# Patient Record
Sex: MALE | Race: BLACK OR AFRICAN AMERICAN | NOT HISPANIC OR LATINO | Employment: OTHER | URBAN - METROPOLITAN AREA
[De-identification: names, ages, dates, MRNs, and addresses within clinical notes are randomized per-mention and may not be internally consistent; named-entity substitution may affect disease eponyms.]

---

## 2018-06-23 ENCOUNTER — HOSPITAL ENCOUNTER (EMERGENCY)
Facility: CLINIC | Age: 48
Discharge: HOME | End: 2018-06-23
Attending: EMERGENCY MEDICINE | Admitting: EMERGENCY MEDICINE
Payer: COMMERCIAL

## 2018-06-23 ENCOUNTER — APPOINTMENT (OUTPATIENT)
Dept: GENERAL RADIOLOGY | Facility: CLINIC | Age: 48
End: 2018-06-23
Attending: EMERGENCY MEDICINE
Payer: COMMERCIAL

## 2018-06-23 VITALS
HEART RATE: 71 BPM | DIASTOLIC BLOOD PRESSURE: 84 MMHG | SYSTOLIC BLOOD PRESSURE: 124 MMHG | TEMPERATURE: 99.2 F | RESPIRATION RATE: 16 BRPM | OXYGEN SATURATION: 96 %

## 2018-06-23 DIAGNOSIS — M79.10 MYALGIA: ICD-10-CM

## 2018-06-23 DIAGNOSIS — H66.003 ACUTE SUPPURATIVE OTITIS MEDIA OF BOTH EARS WITHOUT SPONTANEOUS RUPTURE OF TYMPANIC MEMBRANES, RECURRENCE NOT SPECIFIED: ICD-10-CM

## 2018-06-23 DIAGNOSIS — G44.209 TENSION-TYPE HEADACHE, NOT INTRACTABLE, UNSPECIFIED CHRONICITY PATTERN: ICD-10-CM

## 2018-06-23 DIAGNOSIS — R50.9 FEVER CHILLS: ICD-10-CM

## 2018-06-23 LAB
ANION GAP SERPL CALCULATED.3IONS-SCNC: 6 MMOL/L (ref 3–14)
BASOPHILS # BLD AUTO: 0.1 10E9/L (ref 0–0.2)
BASOPHILS NFR BLD AUTO: 1.2 %
BUN SERPL-MCNC: 11 MG/DL (ref 7–30)
CALCIUM SERPL-MCNC: 8.7 MG/DL (ref 8.5–10.1)
CHLORIDE SERPL-SCNC: 107 MMOL/L (ref 94–109)
CK SERPL-CCNC: 118 U/L (ref 30–300)
CO2 SERPL-SCNC: 28 MMOL/L (ref 20–32)
CREAT SERPL-MCNC: 1.15 MG/DL (ref 0.66–1.25)
DEPRECATED S PYO AG THROAT QL EIA: NORMAL
DIFFERENTIAL METHOD BLD: NORMAL
EOSINOPHIL # BLD AUTO: 0.3 10E9/L (ref 0–0.7)
EOSINOPHIL NFR BLD AUTO: 4.3 %
ERYTHROCYTE [DISTWIDTH] IN BLOOD BY AUTOMATED COUNT: 13 % (ref 10–15)
GFR SERPL CREATININE-BSD FRML MDRD: 68 ML/MIN/1.7M2
GLUCOSE SERPL-MCNC: 82 MG/DL (ref 70–99)
HCT VFR BLD AUTO: 46.4 % (ref 40–53)
HGB BLD-MCNC: 15.5 G/DL (ref 13.3–17.7)
IMM GRANULOCYTES # BLD: 0 10E9/L (ref 0–0.4)
IMM GRANULOCYTES NFR BLD: 0.2 %
LYMPHOCYTES # BLD AUTO: 1.4 10E9/L (ref 0.8–5.3)
LYMPHOCYTES NFR BLD AUTO: 24.4 %
MCH RBC QN AUTO: 30.3 PG (ref 26.5–33)
MCHC RBC AUTO-ENTMCNC: 33.4 G/DL (ref 31.5–36.5)
MCV RBC AUTO: 91 FL (ref 78–100)
MONOCYTES # BLD AUTO: 1 10E9/L (ref 0–1.3)
MONOCYTES NFR BLD AUTO: 17.7 %
NEUTROPHILS # BLD AUTO: 3 10E9/L (ref 1.6–8.3)
NEUTROPHILS NFR BLD AUTO: 52.2 %
NRBC # BLD AUTO: 0 10*3/UL
NRBC BLD AUTO-RTO: 0 /100
PLATELET # BLD AUTO: 237 10E9/L (ref 150–450)
POTASSIUM SERPL-SCNC: 4.3 MMOL/L (ref 3.4–5.3)
RBC # BLD AUTO: 5.12 10E12/L (ref 4.4–5.9)
SODIUM SERPL-SCNC: 141 MMOL/L (ref 133–144)
SPECIMEN SOURCE: NORMAL
WBC # BLD AUTO: 5.8 10E9/L (ref 4–11)

## 2018-06-23 PROCEDURE — 99284 EMERGENCY DEPT VISIT MOD MDM: CPT | Mod: 25

## 2018-06-23 PROCEDURE — 87880 STREP A ASSAY W/OPTIC: CPT | Performed by: EMERGENCY MEDICINE

## 2018-06-23 PROCEDURE — 87081 CULTURE SCREEN ONLY: CPT | Performed by: EMERGENCY MEDICINE

## 2018-06-23 PROCEDURE — 25000132 ZZH RX MED GY IP 250 OP 250 PS 637: Performed by: EMERGENCY MEDICINE

## 2018-06-23 PROCEDURE — 82550 ASSAY OF CK (CPK): CPT | Performed by: EMERGENCY MEDICINE

## 2018-06-23 PROCEDURE — 85025 COMPLETE CBC W/AUTO DIFF WBC: CPT | Performed by: EMERGENCY MEDICINE

## 2018-06-23 PROCEDURE — 71046 X-RAY EXAM CHEST 2 VIEWS: CPT

## 2018-06-23 PROCEDURE — 36415 COLL VENOUS BLD VENIPUNCTURE: CPT | Performed by: EMERGENCY MEDICINE

## 2018-06-23 PROCEDURE — 80048 BASIC METABOLIC PNL TOTAL CA: CPT | Performed by: EMERGENCY MEDICINE

## 2018-06-23 RX ORDER — IBUPROFEN 600 MG/1
600 TABLET, FILM COATED ORAL ONCE
Status: COMPLETED | OUTPATIENT
Start: 2018-06-23 | End: 2018-06-23

## 2018-06-23 RX ORDER — AMOXICILLIN 500 MG/1
500 CAPSULE ORAL 3 TIMES DAILY
Qty: 30 CAPSULE | Refills: 0 | Status: SHIPPED | OUTPATIENT
Start: 2018-06-23 | End: 2018-07-03

## 2018-06-23 RX ADMIN — IBUPROFEN 600 MG: 600 TABLET ORAL at 18:26

## 2018-06-23 ASSESSMENT — ENCOUNTER SYMPTOMS
SORE THROAT: 0
SHORTNESS OF BREATH: 0
FEVER: 1
DIARRHEA: 0
COUGH: 0
VOMITING: 0
ABDOMINAL PAIN: 1
SINUS PRESSURE: 1
RHINORRHEA: 1
HEADACHES: 1

## 2018-06-23 NOTE — ED AVS SNAPSHOT
[facility] Emergency Department    [address] [place] [address]    Phone:  [phone]    Fax:  [phone]                                       Adolfo Penaloza   MRN: [id number]    Department:  [facility] Emergency Department   Date of Visit:  6/23/2018           Patient Information     Date Of Birth          1970        Your diagnoses for this visit were:     Fever chills     Tension-type headache, not intractable, unspecified chronicity pattern     Myalgia     Acute suppurative otitis media of both ears without spontaneous rupture of tympanic membranes, recurrence not specified        You were seen by Jose Arizmendi MD.      Follow-up Information     Follow up with your doctor or [facility]. Schedule an appointment as soon as possible for a visit in 4 days.        Discharge Instructions         Discharge Instructions  Otitis Media  You or your child have an ear infection known as acute otitis media, or middle ear infection (otitis = ear, media = middle). These infections often develop after a virus infection, such as a cold. The cold causes swelling around the pressure-equalizing tube of the ear, which allows fluid to build up in the space behind the eardrum (the middle ear). This fluid build-up can trap bacteria and viruses and increase pressure on the eardrum causing pain.  Return to the Emergency Department if:    You or your child are not better within 24-48 hours.    Your child becomes very fussy or weak.    Your child is showing signs of dehydration, such as less than 3 wet diapers per day.    Your symptoms get worse, or if you develop a severe headache, stiff neck, or new symptoms.    You have signs of allergic reaction to medicine. These include rash, lip swelling, difficulty breathing, wheezing, and dizziness.    Ear infection symptoms:     Symptoms of an ear infection can include ear aching or pain and temporary hearing loss. These  "symptoms often come on suddenly.    Ear infection symptoms (infants / young children):    Fever (temperature greater than 100.4 F or 38 C).    Pulling on the ear.    Fussiness.    Decreased activity.    Lack of appetite or difficulty eating.    Vomiting or diarrhea.    Treatment:     The \"best\" treatment depends on your age, history of previous infections, and any underlying medical problems.    Antibiotics are not given to every patient with an ear infection because studies show that many people with ear infections will improve without using antibiotics. Because antibiotics can have side effects such as diarrhea and stomach upset and can also cause severe allergic reactions, doctors are trying to avoid using antibiotics if it is safe for the patient to do so.   In these cases, a prescription for antibiotics may be given to be filled in 24 -48 hours if symptoms are getting worse or not improving. If the symptoms are improving, the antibiotic does not need to be taken.     Remember, antibiotics do not treat pain.      Pain medications. You may take a pain medication such as Tylenol  (acetaminophen), Advil  (ibuprofen), Nuprin  (ibuprofen) or Aleve  (naproxen).  If you have been given a narcotic such as Vicodin  (hydrocodone with acetaminophen), Percocet  (oxycodone with acetaminophen), or codeine, do not drive for four hours after you have taken it. If the narcotic contains Tylenol  (acetaminophen), do not take Tylenol  with it. All narcotics will cause constipation, so eat a high fiber diet.      Pain treatment options also include ear drops such as Auralgan  (antipyrine/benzocaine/u-polycosanol), which contains a topical numbing medicine.     Do not take a medication if you have a known allergy to that medication.  Probiotics: If you have been given an antibiotic, you may want to also take a probiotic pill or eat yogurt with live cultures. Probiotics have \"good bacteria\" to help your intestines stay healthy. " Studies have shown that probiotics help prevent diarrhea and other intestine problems (including C. diff infection) when you take antibiotics. You can buy these without a prescription in the pharmacy section of the store.   Complications:      Tympanic membrane rupture - One possible complication of an ear infection is rupture of the tympanic membrane, or ear drum. This happens because of pressure on the tympanic membrane from the infected fluid. When the tympanic membrane ruptures, you may have pus or blood drain from the ear. It does not hurt when the membrane ruptures, and many people actually feel better because pressure is released. Fortunately, the tympanic membrane usually heals quickly after rupturing, within hours to days. You should keep water out of the ear until you re-check with your doctor to be sure the ear drum has healed.       Mastoiditis - Rarely, the area behind the ear can become infected, this area is called the mastoid.  If you notice redness and swelling behind your ear, see your physician or return to the Emergency Department immediately.        Hearing loss - The fluid that collects behind the eardrum (called an effusion) can persist for weeks to months after the pain of an ear infection resolves. An effusion causes trouble hearing, which is usually temporary. If the fluid persists, however, it can interfere with the process of learning to speak.   For this reason, children under 2 need to be seen by their pediatrician WITHIN 3 MONTHS to ensure that the fluid has been reabsorbed.  If you were given a prescription for medicine here today, be sure to read all of the information (including the package insert) that comes with your prescription.  This will include important information about the medicine, its side effects, and any warnings that you need to know about.  The pharmacist who fills the prescription can provide more information and answer questions you may have about the medicine.  If  you have questions or concerns that the pharmacist cannot address, please call or return to the Emergency Department.   Opioid Medication Information    Pain medications are among the most commonly prescribed medicines, so we are including this information for all our patients. If you did not receive pain medication or get a prescription for pain medicine, you can ignore it.     You may have been given a prescription for an opioid (narcotic) pain medicine and/or have received a pain medicine while here in the Emergency Department. These medicines can make you drowsy or impaired. You must not drive, operate dangerous equipment, or engage in any other dangerous activities while taking these medications. If you drive while taking these medications, you could be arrested for DUI, or driving under the influence. Do not drink any alcohol while you are taking these medications.     Opioid pain medications can cause addiction. If you have a history of chemical dependency of any type, you are at a higher risk of becoming addicted to pain medications.  Only take these prescribed medications to treat your pain when all other options have been tried. Take it for as short a time and as few doses as possible. Store your pain pills in a secure place, as they are frequently stolen and provide a dangerous opportunity for children or visitors in your house to start abusing these powerful medications. We will not replace any lost or stolen medicine.  As soon as your pain is better, you should flush all your remaining medication.     Many prescription pain medications contain Tylenol  (acetaminophen), including Vicodin , Tylenol #3 , Norco , Lortab , and Percocet .  You should not take any extra pills of Tylenol  if you are using these prescription medications or you can get very sick.  Do not ever take more than 3000 mg of acetaminophen in any 24 hour period.    All opioids tend to cause constipation. Drink plenty of water and eat  foods that have a lot of fiber, such as fruits, vegetables, prune juice, apple juice and high fiber cereal.  Take a laxative if you don t move your bowels at least every other day. Miralax , Milk of Magnesia, Colace , or Senna  can be used to keep you regular.      Remember that you can always come back to the Emergency Department if you are not able to see your regular doctor in the amount of time listed above, if you get any new symptoms, or if there is anything that worries you.      24 Hour Appointment Hotline       To make an appointment at any [facility], call [phone]). If you don't have a family doctor or clinic, we will help you find one. [facility] clinics are conveniently located to serve the needs of you and your family.             Review of your medicines      START taking        Dose / Directions Last dose taken    amoxicillin 500 MG capsule   Commonly known as:  AMOXIL   Dose:  500 mg   Quantity:  30 capsule        Take 1 capsule (500 mg) by mouth 3 times daily for 10 days   Refills:  0                Prescriptions were sent or printed at these locations (1 Prescription)                   Other Prescriptions                Printed at Department/Unit printer (1 of 1)         amoxicillin (AMOXIL) 500 MG capsule                Procedures and tests performed during your visit     Basic metabolic panel    Beta strep group A culture    CBC with platelets differential    CK total    Rapid strep screen    XR Chest 2 Views      Orders Needing Specimen Collection     None      Pending Results     Date and Time Order Name Status Description    6/23/2018 1827 Beta strep group A culture In process             Pending Culture Results     Date and Time Order Name Status Description    6/23/2018 1827 Beta strep group A culture In process             Pending Results Instructions     If you had any lab results that were not finalized at the time of your Discharge, you can call the ED Lab  Result RN at [phone]. You will be contacted by this team for any positive Lab results or changes in treatment. The nurses are available 7 days a week from 10A to 6:30P.  You can leave a message 24 hours per day and they will return your call.        Test Results From Your Hospital Stay        6/23/2018  6:38 PM      Narrative     XR CHEST 2 VW 6/23/2018 6:35 PM    HISTORY: Fever.    COMPARISON: None.        Impression     IMPRESSION: The lungs are clear. No focal pulmonary opacities. Heart  and mediastinum are unremarkable. No acute cardiopulmonary  abnormalities.    JOHANA ORLANDO MD         6/23/2018  6:51 PM      Component Results     Component    Specimen Description    Throat    Rapid Strep A Screen    NEGATIVE: No Group A streptococcal antigen detected by immunoassay, await culture report.         6/23/2018  7:17 PM      Component Results     Component Value Ref Range & Units Status    Sodium 141 133 - 144 mmol/L Final    Potassium 4.3 3.4 - 5.3 mmol/L Final    Chloride 107 94 - 109 mmol/L Final    Carbon Dioxide 28 20 - 32 mmol/L Final    Anion Gap 6 3 - 14 mmol/L Final    Glucose 82 70 - 99 mg/dL Final    Urea Nitrogen 11 7 - 30 mg/dL Final    Creatinine 1.15 0.66 - 1.25 mg/dL Final    GFR Estimate 68 >60 mL/min/1.7m2 Final    Non  GFR Calc    GFR Estimate If Black 82 >60 mL/min/1.7m2 Final    African American GFR Calc    Calcium 8.7 8.5 - 10.1 mg/dL Final         6/23/2018  6:56 PM      Component Results     Component Value Ref Range & Units Status    WBC 5.8 4.0 - 11.0 10e9/L Final    RBC Count 5.12 4.4 - 5.9 10e12/L Final    Hemoglobin 15.5 13.3 - 17.7 g/dL Final    Hematocrit 46.4 40.0 - 53.0 % Final    MCV 91 78 - 100 fl Final    MCH 30.3 26.5 - 33.0 pg Final    MCHC 33.4 31.5 - 36.5 g/dL Final    RDW 13.0 10.0 - 15.0 % Final    Platelet Count 237 150 - 450 10e9/L Final    Diff Method Automated Method  Final    % Neutrophils 52.2 % Final    % Lymphocytes 24.4 % Final    % Monocytes  17.7 % Final    % Eosinophils 4.3 % Final    % Basophils 1.2 % Final    % Immature Granulocytes 0.2 % Final    Nucleated RBCs 0 0 /100 Final    Absolute Neutrophil 3.0 1.6 - 8.3 10e9/L Final    Absolute Lymphocytes 1.4 0.8 - 5.3 10e9/L Final    Absolute Monocytes 1.0 0.0 - 1.3 10e9/L Final    Absolute Eosinophils 0.3 0.0 - 0.7 10e9/L Final    Absolute Basophils 0.1 0.0 - 0.2 10e9/L Final    Abs Immature Granulocytes 0.0 0 - 0.4 10e9/L Final    Absolute Nucleated RBC 0.0  Final         6/23/2018  7:17 PM      Component Results     Component Value Ref Range & Units Status    CK Total 118 30 - 300 U/L Final         6/23/2018  6:51 PM                Clinical Quality Measure: Blood Pressure Screening     Your blood pressure was checked while you were in the emergency department today. The last reading we obtained was  BP: 124/84 . Please read the guidelines below about what these numbers mean and what you should do about them.  If your systolic blood pressure (the top number) is less than 120 and your diastolic blood pressure (the bottom number) is less than 80, then your blood pressure is normal. There is nothing more that you need to do about it.  If your systolic blood pressure (the top number) is 120-139 or your diastolic blood pressure (the bottom number) is 80-89, your blood pressure may be higher than it should be. You should have your blood pressure rechecked within a year by a primary care provider.  If your systolic blood pressure (the top number) is 140 or greater or your diastolic blood pressure (the bottom number) is 90 or greater, you may have high blood pressure. High blood pressure is treatable, but if left untreated over time it can put you at risk for heart attack, stroke, or kidney failure. You should have your blood pressure rechecked by a primary care provider within the next 4 weeks.  If your provider in the emergency department today gave you specific instructions to follow-up with your doctor or  "provider even sooner than that, you should follow that instruction and not wait for up to 4 weeks for your follow-up visit.        Thank you for choosing [facility]       Thank you for choosing [facility] for your care. Our goal is always to provide you with excellent care. Hearing back from our patients is one way we can continue to improve our services. Please take a few minutes to complete the written survey that you may receive in the mail after you visit with us. Thank you!        [employer] Information     [employer] lets you send messages to your doctor, view your test results, renew your prescriptions, schedule appointments and more. To sign up, go to [email or web address] . Click on \"Log in\" on the left side of the screen, which will take you to the Welcome page. Then click on \"Sign up Now\" on the right side of the page.     You will be asked to enter the access code listed below, as well as some personal information. Please follow the directions to create your username and password.     Your access code is: [email or web address]  Expires: 2018  7:30 PM     Your access code will  in 90 days. If you need help or a new code, please call your [facility] clinic or [phone].        Care EveryWhere ID     This is your Care EveryWhere ID. This could be used by other organizations to access your [facility] medical records  [id number]        Equal Access to Services     THADDEUS SMITH AH: Hadii shalini Rodriguez, waaxda indira, qaybta songalbo jacobs. So [facility] [phone].    ATENCIÓN: Si habla español, tiene a snyder disposición servicios gratuitos de asistencia lingüística. Yuliana al [phone].    We comply with applicable federal civil rights laws and [place] laws. We do not discriminate on the basis of race, color, national origin, age, disability, sex, sexual orientation, or gender identity.            After Visit Summary       This is your record. Keep this with " you and show to your community pharmacist(s) and doctor(s) at your next visit.

## 2018-06-23 NOTE — ED AVS SNAPSHOT
[facility] Emergency Department    [address]    [facility] [address]    Phone:  [phone]    Fax:  [phone]                                       Adolfo Penaloza   MRN: [id number]    Department:  [facility] Emergency Department   Date of Visit:  6/23/2018           After Visit Summary Signature Page     I have received my discharge instructions, and my questions have been answered. I have discussed any challenges I see with this plan with the nurse or doctor.    ..........................................................................................................................................  Patient/Patient Representative Signature      ..........................................................................................................................................  Patient Representative Print Name and Relationship to Patient    ..................................................               ................................................  Date                                            Time    ..........................................................................................................................................  Reviewed by Signature/Title    ...................................................              ..............................................  Date                                                            Time

## 2018-06-23 NOTE — ED PROVIDER NOTES
History     Chief Complaint:  Generalized Body Aches    HPI   Adolfo Penaloza is an otherwise healthy 48 year old male who presents to the Emergency Department for evaluation of generalized body aches. The patient reports the onset of fever, chills and headache four days ago, now with generalized body aches, runny nose and mild abdominal pain that is worse after eating. He describes these headaches as more of a facial pressure and states body aches are worse with movement and to palpation. He denies any rash, cough, sore throat, nausea, vomiting, diarrhea, chest pain, shortness of breath or urinary symptoms. No recent travel, use of antibiotics or known ill contacts.Tetanus not up to date.    Allergies:  No known drug allergies    Medications:    The patient is not currently taking any prescribed medications.    Past Medical History:    The patient denies any significant past medical history.    Past Surgical History:    The patient does not have any pertinent past surgical history.    Family History:    No past pertinent family history.    Social History:  The patient was accompanied to the ED by uncle.  Smoking Status: never smoker  Alcohol Use: no  Marital Status:      Review of Systems   Constitutional: Positive for fever.   HENT: Positive for rhinorrhea and sinus pressure. Negative for sore throat.    Respiratory: Negative for cough and shortness of breath.    Gastrointestinal: Positive for abdominal pain. Negative for diarrhea and vomiting.   Skin: Negative for rash.   Neurological: Positive for headaches.   All other systems reviewed and are negative.    Physical Exam   First Vitals:  BP: 128/75  Pulse: 71  Heart Rate: 76  Temp: 99.8  F (37.7  C)  Resp: 18  SpO2: 100 %    Physical Exam  General: The patient is alert, in no respiratory distress.    HENT: Mucous membranes moist. Fluid behind both TM's.    Cardiovascular: Regular rate and rhythm. Good pulses in all four extremities. Normal capillary refill and  skin turgor.     Respiratory: Lungs are clear. No nasal flaring. No retractions. No wheezing, no crackles.    Gastrointestinal: Abdomen soft. No guarding, no rebound. No palpable hernias.   No abdominal tenderness.    Musculoskeletal: No gross deformity. Diffuse muscular tenderness.    Skin: No rashes or petechiae.     Neurologic: The patient is alert and oriented x3. GCS 15. No testable cranial nerve deficit. Follows commands with clear and appropriate speech. Gives appropriate answers. Good strength in all extremities. No gross neurologic deficit. Gross sensation intact. Pupils are round and reactive. No meningismus.     Lymphatic: No cervical adenopathy. No lower extremity swelling.    Psychiatric: The patient is non-tearful.    Emergency Department Course     Imaging:  Radiographic findings were communicated with the patient who voiced understanding of the findings.    Chest XR:  The lungs are clear. No focal pulmonary opacities. Heart  and mediastinum are unremarkable. No acute cardiopulmonary  abnormalities. As per radiology.     Laboratory:  CBC: WBC: 5.8, HGB: 15.5, PLT: 237  BMP: Glucose 82, o/w WNL (Creat 1.15)  CK total 118  Rapid Strep Test: negative  Strep Culture: Pending  Interventions:  1826 Ibuprofen 600 mg PO    Emergency Department Course:  Nursing notes and vitals reviewed. I performed an exam of the patient as documented above.     The patient was sent for a chest x-ray while here in the emergency department, findings above.    Blood drawn. This was sent to the lab for further testing, results above.    I reassessed the patient. Patient is feeling much improved here in the ED.    Findings and plan explained to the Patient. Patient discharged home with instructions regarding supportive care, medications, and reasons to return. The importance of close follow-up was reviewed.    Impression & Plan      Medical Decision Making:  Adolfo Penaloza is a 48 year old male who presents with several days of  first chills, headache and now has diffuse myalgias. His source is likely his otitis media. I did run lab work for his studies including CK due to muscle tenderness although these are all normal. There is no elevation of white count, no signs of pneumonia and his strep is negative. The patient was feeling better here and was discharged home in good condition. We discussed symptomatic treatment. He does not appear septic and he was discharged home and recommended close follow up.    Diagnosis:    ICD-10-CM    1. Fever chills R50.9    2. Tension-type headache, not intractable, unspecified chronicity pattern G44.209    3. Myalgia M79.1    4. Acute suppurative otitis media of both ears without spontaneous rupture of tympanic membranes, recurrence not specified H66.003      Disposition:  discharged to home    Discharge Medications:  New Prescriptions    AMOXICILLIN (AMOXIL) 500 MG CAPSULE    Take 1 capsule (500 mg) by mouth 3 times daily for 10 days       ICeline, am serving as a scribe on 6/23/2018 at 6:02 PM to personally document services performed by Jose Arizmendi MD based on my observations and the provider's statements to me.     Celine Ferro  6/23/2018   [facility] EMERGENCY DEPARTMENT       Jose Arizmendi MD  06/24/18 0107

## 2018-06-24 NOTE — DISCHARGE INSTRUCTIONS
"  Discharge Instructions  Otitis Media  You or your child have an ear infection known as acute otitis media, or middle ear infection (otitis = ear, media = middle). These infections often develop after a virus infection, such as a cold. The cold causes swelling around the pressure-equalizing tube of the ear, which allows fluid to build up in the space behind the eardrum (the middle ear). This fluid build-up can trap bacteria and viruses and increase pressure on the eardrum causing pain.  Return to the Emergency Department if:    You or your child are not better within 24-48 hours.    Your child becomes very fussy or weak.    Your child is showing signs of dehydration, such as less than 3 wet diapers per day.    Your symptoms get worse, or if you develop a severe headache, stiff neck, or new symptoms.    You have signs of allergic reaction to medicine. These include rash, lip swelling, difficulty breathing, wheezing, and dizziness.    Ear infection symptoms:     Symptoms of an ear infection can include ear aching or pain and temporary hearing loss. These symptoms often come on suddenly.    Ear infection symptoms (infants / young children):    Fever (temperature greater than 100.4 F or 38 C).    Pulling on the ear.    Fussiness.    Decreased activity.    Lack of appetite or difficulty eating.    Vomiting or diarrhea.    Treatment:     The \"best\" treatment depends on your age, history of previous infections, and any underlying medical problems.    Antibiotics are not given to every patient with an ear infection because studies show that many people with ear infections will improve without using antibiotics. Because antibiotics can have side effects such as diarrhea and stomach upset and can also cause severe allergic reactions, doctors are trying to avoid using antibiotics if it is safe for the patient to do so.   In these cases, a prescription for antibiotics may be given to be filled in 24 -48 hours if symptoms are " "getting worse or not improving. If the symptoms are improving, the antibiotic does not need to be taken.     Remember, antibiotics do not treat pain.      Pain medications. You may take a pain medication such as Tylenol  (acetaminophen), Advil  (ibuprofen), Nuprin  (ibuprofen) or Aleve  (naproxen).  If you have been given a narcotic such as Vicodin  (hydrocodone with acetaminophen), Percocet  (oxycodone with acetaminophen), or codeine, do not drive for four hours after you have taken it. If the narcotic contains Tylenol  (acetaminophen), do not take Tylenol  with it. All narcotics will cause constipation, so eat a high fiber diet.      Pain treatment options also include ear drops such as Auralgan  (antipyrine/benzocaine/u-polycosanol), which contains a topical numbing medicine.     Do not take a medication if you have a known allergy to that medication.  Probiotics: If you have been given an antibiotic, you may want to also take a probiotic pill or eat yogurt with live cultures. Probiotics have \"good bacteria\" to help your intestines stay healthy. Studies have shown that probiotics help prevent diarrhea and other intestine problems (including C. diff infection) when you take antibiotics. You can buy these without a prescription in the pharmacy section of the store.   Complications:      Tympanic membrane rupture - One possible complication of an ear infection is rupture of the tympanic membrane, or ear drum. This happens because of pressure on the tympanic membrane from the infected fluid. When the tympanic membrane ruptures, you may have pus or blood drain from the ear. It does not hurt when the membrane ruptures, and many people actually feel better because pressure is released. Fortunately, the tympanic membrane usually heals quickly after rupturing, within hours to days. You should keep water out of the ear until you re-check with your doctor to be sure the ear drum has healed.       Mastoiditis - Rarely, the " area behind the ear can become infected, this area is called the mastoid.  If you notice redness and swelling behind your ear, see your physician or return to the Emergency Department immediately.        Hearing loss - The fluid that collects behind the eardrum (called an effusion) can persist for weeks to months after the pain of an ear infection resolves. An effusion causes trouble hearing, which is usually temporary. If the fluid persists, however, it can interfere with the process of learning to speak.   For this reason, children under 2 need to be seen by their pediatrician WITHIN 3 MONTHS to ensure that the fluid has been reabsorbed.  If you were given a prescription for medicine here today, be sure to read all of the information (including the package insert) that comes with your prescription.  This will include important information about the medicine, its side effects, and any warnings that you need to know about.  The pharmacist who fills the prescription can provide more information and answer questions you may have about the medicine.  If you have questions or concerns that the pharmacist cannot address, please call or return to the Emergency Department.   Opioid Medication Information    Pain medications are among the most commonly prescribed medicines, so we are including this information for all our patients. If you did not receive pain medication or get a prescription for pain medicine, you can ignore it.     You may have been given a prescription for an opioid (narcotic) pain medicine and/or have received a pain medicine while here in the Emergency Department. These medicines can make you drowsy or impaired. You must not drive, operate dangerous equipment, or engage in any other dangerous activities while taking these medications. If you drive while taking these medications, you could be arrested for DUI, or driving under the influence. Do not drink any alcohol while you are taking these  medications.     Opioid pain medications can cause addiction. If you have a history of chemical dependency of any type, you are at a higher risk of becoming addicted to pain medications.  Only take these prescribed medications to treat your pain when all other options have been tried. Take it for as short a time and as few doses as possible. Store your pain pills in a secure place, as they are frequently stolen and provide a dangerous opportunity for children or visitors in your house to start abusing these powerful medications. We will not replace any lost or stolen medicine.  As soon as your pain is better, you should flush all your remaining medication.     Many prescription pain medications contain Tylenol  (acetaminophen), including Vicodin , Tylenol #3 , Norco , Lortab , and Percocet .  You should not take any extra pills of Tylenol  if you are using these prescription medications or you can get very sick.  Do not ever take more than 3000 mg of acetaminophen in any 24 hour period.    All opioids tend to cause constipation. Drink plenty of water and eat foods that have a lot of fiber, such as fruits, vegetables, prune juice, apple juice and high fiber cereal.  Take a laxative if you don t move your bowels at least every other day. Miralax , Milk of Magnesia, Colace , or Senna  can be used to keep you regular.      Remember that you can always come back to the Emergency Department if you are not able to see your regular doctor in the amount of time listed above, if you get any new symptoms, or if there is anything that worries you.

## 2018-06-25 LAB
BACTERIA SPEC CULT: NORMAL
Lab: NORMAL
SPECIMEN SOURCE: NORMAL

## 2019-02-28 ENCOUNTER — OFFICE VISIT (OUTPATIENT)
Dept: OCCUPATIONAL MEDICINE | Age: 49
End: 2019-02-28

## 2019-02-28 DIAGNOSIS — Z00.8 HEALTH EXAMINATION IN POPULATION SURVEY: Primary | ICD-10-CM

## 2019-02-28 PROCEDURE — OH035 DOT/N-DOT DS COLLECTION ONLY (ALL TYPES): Performed by: PHYSICIAN ASSISTANT

## 2021-05-11 ENCOUNTER — IMMUNIZATION (OUTPATIENT)
Dept: NURSING | Facility: CLINIC | Age: 51
End: 2021-05-11
Payer: COMMERCIAL

## 2021-05-11 PROCEDURE — 91300 PR COVID VAC PFIZER DIL RECON 30 MCG/0.3 ML IM: CPT

## 2021-05-11 PROCEDURE — 0001A PR COVID VAC PFIZER DIL RECON 30 MCG/0.3 ML IM: CPT

## 2023-09-04 ENCOUNTER — APPOINTMENT (OUTPATIENT)
Dept: CT IMAGING | Facility: CLINIC | Age: 53
End: 2023-09-04
Attending: EMERGENCY MEDICINE
Payer: COMMERCIAL

## 2023-09-04 ENCOUNTER — HOSPITAL ENCOUNTER (EMERGENCY)
Facility: CLINIC | Age: 53
Discharge: HOME | End: 2023-09-04
Attending: EMERGENCY MEDICINE | Admitting: EMERGENCY MEDICINE
Payer: COMMERCIAL

## 2023-09-04 ENCOUNTER — APPOINTMENT (OUTPATIENT)
Dept: GENERAL RADIOLOGY | Facility: CLINIC | Age: 53
End: 2023-09-04
Attending: EMERGENCY MEDICINE
Payer: COMMERCIAL

## 2023-09-04 VITALS
RESPIRATION RATE: 18 BRPM | SYSTOLIC BLOOD PRESSURE: 129 MMHG | DIASTOLIC BLOOD PRESSURE: 86 MMHG | OXYGEN SATURATION: 92 % | TEMPERATURE: 98.1 F | WEIGHT: 202 LBS | HEART RATE: 66 BPM

## 2023-09-04 DIAGNOSIS — R06.09 DYSPNEA ON EXERTION: ICD-10-CM

## 2023-09-04 DIAGNOSIS — R53.1 GENERALIZED WEAKNESS: ICD-10-CM

## 2023-09-04 LAB
ALBUMIN SERPL BCG-MCNC: 4.3 G/DL (ref 3.5–5.2)
ALBUMIN UR-MCNC: NEGATIVE MG/DL
ALP SERPL-CCNC: 82 U/L (ref 40–129)
ALT SERPL W P-5'-P-CCNC: 24 U/L (ref 0–70)
ANION GAP SERPL CALCULATED.3IONS-SCNC: 8 MMOL/L (ref 7–15)
APPEARANCE UR: CLEAR
AST SERPL W P-5'-P-CCNC: 30 U/L (ref 0–45)
BASOPHILS # BLD AUTO: 0.1 10E3/UL (ref 0–0.2)
BASOPHILS NFR BLD AUTO: 1 %
BILIRUB SERPL-MCNC: 0.6 MG/DL
BILIRUB UR QL STRIP: NEGATIVE
BUN SERPL-MCNC: 10.3 MG/DL (ref 6–20)
CALCIUM SERPL-MCNC: 9.8 MG/DL (ref 8.6–10)
CHLORIDE SERPL-SCNC: 99 MMOL/L (ref 98–107)
COLOR UR AUTO: NORMAL
CREAT SERPL-MCNC: 1.04 MG/DL (ref 0.67–1.17)
DEPRECATED HCO3 PLAS-SCNC: 28 MMOL/L (ref 22–29)
EOSINOPHIL # BLD AUTO: 0.1 10E3/UL (ref 0–0.7)
EOSINOPHIL NFR BLD AUTO: 2 %
ERYTHROCYTE [DISTWIDTH] IN BLOOD BY AUTOMATED COUNT: 13.1 % (ref 10–15)
FLUAV RNA SPEC QL NAA+PROBE: NEGATIVE
FLUBV RNA RESP QL NAA+PROBE: NEGATIVE
GFR SERPL CREATININE-BSD FRML MDRD: 86 ML/MIN/1.73M2
GLUCOSE SERPL-MCNC: 92 MG/DL (ref 70–99)
GLUCOSE UR STRIP-MCNC: NEGATIVE MG/DL
HCT VFR BLD AUTO: 47.5 % (ref 40–53)
HGB BLD-MCNC: 15.5 G/DL (ref 13.3–17.7)
HGB UR QL STRIP: NEGATIVE
IMM GRANULOCYTES # BLD: 0 10E3/UL
IMM GRANULOCYTES NFR BLD: 0 %
KETONES UR STRIP-MCNC: NEGATIVE MG/DL
LEUKOCYTE ESTERASE UR QL STRIP: NEGATIVE
LYMPHOCYTES # BLD AUTO: 2.5 10E3/UL (ref 0.8–5.3)
LYMPHOCYTES NFR BLD AUTO: 47 %
MCH RBC QN AUTO: 29.2 PG (ref 26.5–33)
MCHC RBC AUTO-ENTMCNC: 32.6 G/DL (ref 31.5–36.5)
MCV RBC AUTO: 90 FL (ref 78–100)
MONOCYTES # BLD AUTO: 0.7 10E3/UL (ref 0–1.3)
MONOCYTES NFR BLD AUTO: 13 %
NEUTROPHILS # BLD AUTO: 1.9 10E3/UL (ref 1.6–8.3)
NEUTROPHILS NFR BLD AUTO: 37 %
NITRATE UR QL: NEGATIVE
NRBC # BLD AUTO: 0 10E3/UL
NRBC BLD AUTO-RTO: 0 /100
NT-PROBNP SERPL-MCNC: 71 PG/ML (ref 0–900)
PH UR STRIP: 6.5 [PH] (ref 5–7)
PLATELET # BLD AUTO: 353 10E3/UL (ref 150–450)
POTASSIUM SERPL-SCNC: 4.1 MMOL/L (ref 3.4–5.3)
PROT SERPL-MCNC: 8.1 G/DL (ref 6.4–8.3)
RBC # BLD AUTO: 5.31 10E6/UL (ref 4.4–5.9)
RBC URINE: <1 /HPF
RSV RNA SPEC NAA+PROBE: NEGATIVE
SARS-COV-2 RNA RESP QL NAA+PROBE: NEGATIVE
SODIUM SERPL-SCNC: 135 MMOL/L (ref 136–145)
SP GR UR STRIP: 1.01 (ref 1–1.03)
TROPONIN T SERPL HS-MCNC: <6 NG/L
UROBILINOGEN UR STRIP-MCNC: NORMAL MG/DL
WBC # BLD AUTO: 5.2 10E3/UL (ref 4–11)
WBC URINE: <1 /HPF

## 2023-09-04 PROCEDURE — 71046 X-RAY EXAM CHEST 2 VIEWS: CPT

## 2023-09-04 PROCEDURE — 80053 COMPREHEN METABOLIC PANEL: CPT | Performed by: EMERGENCY MEDICINE

## 2023-09-04 PROCEDURE — 87637 SARSCOV2&INF A&B&RSV AMP PRB: CPT | Performed by: EMERGENCY MEDICINE

## 2023-09-04 PROCEDURE — 81001 URINALYSIS AUTO W/SCOPE: CPT | Performed by: EMERGENCY MEDICINE

## 2023-09-04 PROCEDURE — 85025 COMPLETE CBC W/AUTO DIFF WBC: CPT | Performed by: EMERGENCY MEDICINE

## 2023-09-04 PROCEDURE — 36415 COLL VENOUS BLD VENIPUNCTURE: CPT | Performed by: EMERGENCY MEDICINE

## 2023-09-04 PROCEDURE — 70450 CT HEAD/BRAIN W/O DYE: CPT

## 2023-09-04 PROCEDURE — 83880 ASSAY OF NATRIURETIC PEPTIDE: CPT | Performed by: EMERGENCY MEDICINE

## 2023-09-04 PROCEDURE — 99285 EMERGENCY DEPT VISIT HI MDM: CPT | Mod: 25

## 2023-09-04 PROCEDURE — 93005 ELECTROCARDIOGRAM TRACING: CPT

## 2023-09-04 PROCEDURE — 84484 ASSAY OF TROPONIN QUANT: CPT | Performed by: EMERGENCY MEDICINE

## 2023-09-04 ASSESSMENT — ACTIVITIES OF DAILY LIVING (ADL)
ADLS_ACUITY_SCORE: 35
ADLS_ACUITY_SCORE: 35

## 2023-09-04 NOTE — LETTER
September 4, 2023      To Whom It May Concern:      Adolfo Penaloza was seen in our Emergency Department today, 09/04/23.  I expect his condition to improve over the next 2-3 days.  He may return to work/school when improved.    Sincerely,        Marissa Sheridan RN

## 2023-09-04 NOTE — ED TRIAGE NOTES
Pt is a  and c/o dyspnea on exertion and chest pressure, body aches, and overall fatigue with nausea. Sx for months, sx increased recently. Pt also reports HA. Denies dizziness, fall. ABC in tact. A/Ox4

## 2023-09-04 NOTE — ED PROVIDER NOTES
History     Chief Complaint:  Shortness of Breath (w) and Generalized Weakness       HPI   Adolfo Penaloza is a 53 year old male who presents for evaluation of shortness of breath chest heaviness, and generalized weakness.  He reports symptoms have been ongoing for about 1 month.  There is also an associated headache.  Denies dizziness or loss of consciousness.  Patient works as a .  Denies fevers or chills.  Denies vomiting or diarrhea.      Independent Historian:   None - Patient Only    Review of External Notes:   Reviewed nurse triage note from 8/29/2023.      Medications:    Albuterol   Cyclosporine   Cholecalciferol     Past Medical History:    Chronic back pain with sciatica   TB lung, latent   Pre-diabetes  Chronic fatigue   Hypothyroidism     Physical Exam   Patient Vitals for the past 24 hrs:   BP Temp Temp src Pulse Resp SpO2 Weight   09/04/23 1813 -- -- -- -- 18 -- --   09/04/23 1807 -- -- -- -- -- 92 % --   09/04/23 1806 -- -- -- -- -- 98 % --   09/04/23 1805 -- -- -- -- -- 99 % --   09/04/23 1804 -- -- -- -- -- 100 % --   09/04/23 1803 -- -- -- -- -- 93 % --   09/04/23 1802 -- -- -- -- -- 100 % --   09/04/23 1801 -- -- -- -- -- 100 % --   09/04/23 1800 (!) 165/103 -- -- 66 -- 100 % --   09/04/23 1735 -- -- -- -- -- 100 % --   09/04/23 1720 (!) 148/98 -- -- 57 -- 100 % --   09/04/23 1705 (!) 144/94 -- -- -- -- 100 % --   09/04/23 1650 -- -- -- -- -- 99 % --   09/04/23 1620 117/79 -- -- 63 -- 100 % --   09/04/23 1605 (!) 143/90 -- -- -- -- 100 % --   09/04/23 1535 126/81 -- -- -- -- 100 % --   09/04/23 1459 -- -- -- -- -- 99 % --   09/04/23 1444 122/86 -- -- -- -- 100 % --   09/04/23 1429 124/83 -- -- 57 -- 100 % --   09/04/23 1417 129/82 98.1  F (36.7  C) Oral 64 -- 99 % --   09/04/23 1312 103/74 98.1  F (36.7  C) Temporal 69 16 99 % 91.6 kg (202 lb)        Physical Exam  Nursing note and vitals reviewed.  HENT:   Mouth/Throat: Moist mucous membranes.   Eyes: EOMI, nonicteric  sclera  Cardiovascular: Normal rate, regular rhythm, no murmurs, rubs, or gallops  Pulmonary/Chest: Effort normal and breath sounds normal. No respiratory distress. No wheezes. No rales.   Abdominal: Soft. Nontender, nondistended, no guarding or rigidity.   Musculoskeletal: Normal range of motion.   Neurological: Alert. Moves all extremities spontaneously.   Skin: Skin is warm and dry. No rash noted.         Emergency Department Course   ECG  ECG results from 09/04/23   EKG 12-lead, tracing only     Value    Systolic Blood Pressure     Diastolic Blood Pressure     Ventricular Rate 62    Atrial Rate 62    HI Interval 228    QRS Duration 106        QTc 414    P Axis 82    R AXIS 62    T Axis 67    Interpretation ECG      Sinus rhythm with 1st degree A-V block  Otherwise normal ECG  No previous ECGs available  Confirmed by - EMERGENCY ROOM, PHYSICIAN ([id number]),  ERIK BOB ([id number]) on 9/5/2023 7:03:48 AM           Imaging:  Chest XR,  PA & LAT   Final Result   IMPRESSION: Negative chest.      Head CT w/o contrast   Final Result   IMPRESSION:   1.  Normal head CT.         Report per radiology    Laboratory:  Labs Ordered and Resulted from Time of ED Arrival to Time of ED Departure   COMPREHENSIVE METABOLIC PANEL - Abnormal       Result Value    Sodium 135 (*)     Potassium 4.1      Chloride 99      Carbon Dioxide (CO2) 28      Anion Gap 8      Urea Nitrogen 10.3      Creatinine 1.04      Calcium 9.8      Glucose 92      Alkaline Phosphatase 82      AST 30      ALT 24      Protein Total 8.1      Albumin 4.3      Bilirubin Total 0.6      GFR Estimate 86     TROPONIN T, HIGH SENSITIVITY - Normal    Troponin T, High Sensitivity <6     INFLUENZA A/B, RSV, & SARS-COV2 PCR - Normal    Influenza A PCR Negative      Influenza B PCR Negative      RSV PCR Negative      SARS CoV2 PCR Negative     ROUTINE UA WITH MICROSCOPIC REFLEX TO CULTURE - Normal    Color Urine Light Yellow      Appearance Urine Clear      Glucose  Urine Negative      Bilirubin Urine Negative      Ketones Urine Negative      Specific Gravity Urine 1.009      Blood Urine Negative      pH Urine 6.5      Protein Albumin Urine Negative      Urobilinogen Urine Normal      Nitrite Urine Negative      Leukocyte Esterase Urine Negative      RBC Urine <1      WBC Urine <1     NT PROBNP INPATIENT - Normal    N terminal Pro BNP Inpatient 71     CBC WITH PLATELETS AND DIFFERENTIAL    WBC Count 5.2      RBC Count 5.31      Hemoglobin 15.5      Hematocrit 47.5      MCV 90      MCH 29.2      MCHC 32.6      RDW 13.1      Platelet Count 353      % Neutrophils 37      % Lymphocytes 47      % Monocytes 13      % Eosinophils 2      % Basophils 1      % Immature Granulocytes 0      NRBCs per 100 WBC 0      Absolute Neutrophils 1.9      Absolute Lymphocytes 2.5      Absolute Monocytes 0.7      Absolute Eosinophils 0.1      Absolute Basophils 0.1      Absolute Immature Granulocytes 0.0      Absolute NRBCs 0.0              Emergency Department Course & Assessments:        Independent Interpretation (X-rays, CTs, rhythm strip):  I independently reviewed the chest x-ray. I see no evidence of pneumothorax or infiltrate.       Consultations/Discussion of Management or Tests:  None     Social Determinants of Health affecting care:   None    Disposition:  The patient was discharged to home.     Impression & Plan      Medical Decision Making:  Patient presents with multiple complaints ongoing intermittently for at least 1 month with recent worsening.  Vital signs are unremarkable.  Physical exam is normal.  Broad differential considered including ACS, heart failure, PE, infection, electrolyte derangement, among many other etiologies.  EKG is without evidence of ischemia.  Troponin is undetectable, and given duration of symptoms this is sufficient to rule out ACS.  Chest x-ray does not suggest alternate etiology such as pneumothorax, infiltrate.  Given his complaints of headache, CT was also  obtained which is also negative.  Symptomatology is inconsistent with PE given its intermittent nature, and vital signs are reassuring, therefore further work-up for this was not pursued.  proBNP was normal ruling out heart failure.  Discussed with patient that I am uncertain as to the etiology of his symptoms, but emergent pathology appears less likely.  I did encourage him to follow-up with his primary care physician for further evaluation and treatment.  We also discussed indications for emergency department return.  He is discharged in stable condition.  All questions answered and he is in agreement with the plan.      Diagnosis:    ICD-10-CM    1. Generalized weakness  R53.1       2. Dyspnea on exertion  R06.09            Scribe Disclosure:  I, Talya Javierbere, am serving as a scribe at 6:12 PM on 9/4/2023 to document services personally performed by Dario Jurado MD based on my observations and the provider's statements to me.          Dario Jurado MD  09/06/23 [id number]

## 2023-09-05 LAB
ATRIAL RATE - MUSE: 62 BPM
DIASTOLIC BLOOD PRESSURE - MUSE: NORMAL MMHG
INTERPRETATION ECG - MUSE: NORMAL
P AXIS - MUSE: 82 DEGREES
PR INTERVAL - MUSE: 228 MS
QRS DURATION - MUSE: 106 MS
QT - MUSE: 408 MS
QTC - MUSE: 414 MS
R AXIS - MUSE: 62 DEGREES
SYSTOLIC BLOOD PRESSURE - MUSE: NORMAL MMHG
T AXIS - MUSE: 67 DEGREES
VENTRICULAR RATE- MUSE: 62 BPM